# Patient Record
Sex: MALE | Race: BLACK OR AFRICAN AMERICAN | NOT HISPANIC OR LATINO | URBAN - METROPOLITAN AREA
[De-identification: names, ages, dates, MRNs, and addresses within clinical notes are randomized per-mention and may not be internally consistent; named-entity substitution may affect disease eponyms.]

---

## 2018-12-18 ENCOUNTER — EMERGENCY (EMERGENCY)
Facility: HOSPITAL | Age: 33
LOS: 1 days | Discharge: LEFT BEFORE DISCHARGE | End: 2018-12-18
Admitting: EMERGENCY MEDICINE

## 2018-12-18 VITALS
SYSTOLIC BLOOD PRESSURE: 129 MMHG | OXYGEN SATURATION: 100 % | TEMPERATURE: 98 F | DIASTOLIC BLOOD PRESSURE: 79 MMHG | WEIGHT: 139.99 LBS | HEART RATE: 104 BPM | RESPIRATION RATE: 16 BRPM

## 2018-12-18 NOTE — ED ADULT NURSE NOTE - CHIEF COMPLAINT QUOTE
c/o b/l eye redness, worse on red x 4 days ago. 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 denies vision change, pain, blurry vision, trauma. blood noted to sclera on right eye.

## 2018-12-18 NOTE — ED ADULT TRIAGE NOTE - CHIEF COMPLAINT QUOTE
c/o b/l eye redness, worse on red x 4 days ago. 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 denies vision change, pain, blurry vision. c/o b/l eye redness, worse on red x 4 days ago. 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 denies vision change, pain, blurry vision. blood noted to sclera on right eye. c/o b/l eye redness, worse on red x 4 days ago. 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 denies vision change, pain, blurry vision, trauma. blood noted to sclera on right eye.

## 2018-12-19 ENCOUNTER — EMERGENCY (EMERGENCY)
Facility: HOSPITAL | Age: 33
LOS: 1 days | Discharge: HOME | End: 2018-12-19
Attending: EMERGENCY MEDICINE | Admitting: EMERGENCY MEDICINE
Payer: SELF-PAY

## 2018-12-19 VITALS
OXYGEN SATURATION: 100 % | HEART RATE: 84 BPM | DIASTOLIC BLOOD PRESSURE: 84 MMHG | TEMPERATURE: 98 F | SYSTOLIC BLOOD PRESSURE: 133 MMHG | RESPIRATION RATE: 20 BRPM

## 2018-12-19 PROCEDURE — 99053 MED SERV 10PM-8AM 24 HR FAC: CPT

## 2018-12-19 PROCEDURE — 99283 EMERGENCY DEPT VISIT LOW MDM: CPT | Mod: 25

## 2018-12-19 RX ORDER — TETRAHYDROZOLINE/POLYETHYL GLY 0.05 %-1 %
0 DROPS OPHTHALMIC (EYE)
Qty: 0 | Refills: 0 | COMMUNITY

## 2018-12-19 NOTE — ED ADULT NURSE NOTE - OBJECTIVE STATEMENT
34 yo M c.o BL eye redness x 4 days. pt states "it started in my right eye and then went to my left eye". Pt states that eyes were itching so he started using visine which helped a little but he is waking up with crust in his eyes.  Pt denies visual disturbances at this time.

## 2018-12-19 NOTE — ED PROVIDER NOTE - OBJECTIVE STATEMENT
Patient with no pmhx, presents with itchy, red, and purulent R eye. denies trauma. denies visual changes. notes symptoms started one week ago, after he scratched his R eye with dirty hands, noticed the itching and yellowish crusting and drainage. does not wear contact lenses. denies facial swelling. notes symptoms now spreading to L eye. denies other symptoms.

## 2018-12-22 DIAGNOSIS — H53.8 OTHER VISUAL DISTURBANCES: ICD-10-CM

## 2018-12-23 DIAGNOSIS — H10.89 OTHER CONJUNCTIVITIS: ICD-10-CM

## 2018-12-23 DIAGNOSIS — H57.89 OTHER SPECIFIED DISORDERS OF EYE AND ADNEXA: ICD-10-CM

## 2018-12-23 DIAGNOSIS — Z79.899 OTHER LONG TERM (CURRENT) DRUG THERAPY: ICD-10-CM

## 2022-10-07 ENCOUNTER — EMERGENCY (EMERGENCY)
Facility: HOSPITAL | Age: 37
LOS: 1 days | Discharge: HOME | End: 2022-10-07
Admitting: EMERGENCY MEDICINE

## 2022-10-07 VITALS
WEIGHT: 139.99 LBS | TEMPERATURE: 98 F | OXYGEN SATURATION: 97 % | HEART RATE: 120 BPM | DIASTOLIC BLOOD PRESSURE: 79 MMHG | SYSTOLIC BLOOD PRESSURE: 144 MMHG | HEIGHT: 66 IN | RESPIRATION RATE: 18 BRPM

## 2022-10-07 VITALS — HEART RATE: 112 BPM | RESPIRATION RATE: 18 BRPM | OXYGEN SATURATION: 99 %

## 2022-10-07 LAB
ALBUMIN SERPL ELPH-MCNC: 4.2 G/DL — SIGNIFICANT CHANGE UP (ref 3.4–5)
ALP SERPL-CCNC: 55 U/L — SIGNIFICANT CHANGE UP (ref 40–120)
ALT FLD-CCNC: 34 U/L — SIGNIFICANT CHANGE UP (ref 12–42)
ANION GAP SERPL CALC-SCNC: 6 MMOL/L — LOW (ref 9–16)
ANISOCYTOSIS BLD QL: SLIGHT — SIGNIFICANT CHANGE UP
APPEARANCE UR: CLEAR — SIGNIFICANT CHANGE UP
AST SERPL-CCNC: 30 U/L — SIGNIFICANT CHANGE UP (ref 15–37)
BASOPHILS # BLD AUTO: 0.04 K/UL — SIGNIFICANT CHANGE UP (ref 0–0.2)
BASOPHILS NFR BLD AUTO: 0.6 % — SIGNIFICANT CHANGE UP (ref 0–2)
BILIRUB SERPL-MCNC: 0.3 MG/DL — SIGNIFICANT CHANGE UP (ref 0.2–1.2)
BILIRUB UR-MCNC: NEGATIVE — SIGNIFICANT CHANGE UP
BUN SERPL-MCNC: 16 MG/DL — SIGNIFICANT CHANGE UP (ref 7–23)
CALCIUM SERPL-MCNC: 9.1 MG/DL — SIGNIFICANT CHANGE UP (ref 8.5–10.5)
CHLORIDE SERPL-SCNC: 102 MMOL/L — SIGNIFICANT CHANGE UP (ref 96–108)
CO2 SERPL-SCNC: 29 MMOL/L — SIGNIFICANT CHANGE UP (ref 22–31)
COLOR SPEC: YELLOW — SIGNIFICANT CHANGE UP
CREAT SERPL-MCNC: 1.33 MG/DL — HIGH (ref 0.5–1.3)
D DIMER BLD IA.RAPID-MCNC: <187 NG/ML DDU — SIGNIFICANT CHANGE UP
DIFF PNL FLD: NEGATIVE — SIGNIFICANT CHANGE UP
EGFR: 70 ML/MIN/1.73M2 — SIGNIFICANT CHANGE UP
EOSINOPHIL # BLD AUTO: 0.08 K/UL — SIGNIFICANT CHANGE UP (ref 0–0.5)
EOSINOPHIL NFR BLD AUTO: 1.1 % — SIGNIFICANT CHANGE UP (ref 0–6)
GLUCOSE SERPL-MCNC: 178 MG/DL — HIGH (ref 70–99)
GLUCOSE UR QL: NEGATIVE — SIGNIFICANT CHANGE UP
HCT VFR BLD CALC: 42.2 % — SIGNIFICANT CHANGE UP (ref 39–50)
HGB BLD-MCNC: 12.5 G/DL — LOW (ref 13–17)
IMM GRANULOCYTES NFR BLD AUTO: 0.3 % — SIGNIFICANT CHANGE UP (ref 0–0.9)
KETONES UR-MCNC: NEGATIVE — SIGNIFICANT CHANGE UP
LEUKOCYTE ESTERASE UR-ACNC: NEGATIVE — SIGNIFICANT CHANGE UP
LYMPHOCYTES # BLD AUTO: 1.28 K/UL — SIGNIFICANT CHANGE UP (ref 1–3.3)
LYMPHOCYTES # BLD AUTO: 18.3 % — SIGNIFICANT CHANGE UP (ref 13–44)
MACROCYTES BLD QL: SLIGHT — SIGNIFICANT CHANGE UP
MANUAL SMEAR VERIFICATION: SIGNIFICANT CHANGE UP
MCHC RBC-ENTMCNC: 20.8 PG — LOW (ref 27–34)
MCHC RBC-ENTMCNC: 29.6 GM/DL — LOW (ref 32–36)
MCV RBC AUTO: 70.3 FL — LOW (ref 80–100)
MONOCYTES # BLD AUTO: 0.48 K/UL — SIGNIFICANT CHANGE UP (ref 0–0.9)
MONOCYTES NFR BLD AUTO: 6.9 % — SIGNIFICANT CHANGE UP (ref 2–14)
NEUTROPHILS # BLD AUTO: 5.08 K/UL — SIGNIFICANT CHANGE UP (ref 1.8–7.4)
NEUTROPHILS NFR BLD AUTO: 72.8 % — SIGNIFICANT CHANGE UP (ref 43–77)
NITRITE UR-MCNC: NEGATIVE — SIGNIFICANT CHANGE UP
NRBC # BLD: 0 /100 WBCS — SIGNIFICANT CHANGE UP (ref 0–0)
PCP SPEC-MCNC: SIGNIFICANT CHANGE UP
PH UR: 6 — SIGNIFICANT CHANGE UP (ref 5–8)
PLAT MORPH BLD: ABNORMAL
PLATELET # BLD AUTO: 241 K/UL — SIGNIFICANT CHANGE UP (ref 150–400)
PLATELET COUNT - ESTIMATE: NORMAL — SIGNIFICANT CHANGE UP
POTASSIUM SERPL-MCNC: 3.8 MMOL/L — SIGNIFICANT CHANGE UP (ref 3.5–5.3)
POTASSIUM SERPL-SCNC: 3.8 MMOL/L — SIGNIFICANT CHANGE UP (ref 3.5–5.3)
PROT SERPL-MCNC: 7.7 G/DL — SIGNIFICANT CHANGE UP (ref 6.4–8.2)
PROT UR-MCNC: NEGATIVE MG/DL — SIGNIFICANT CHANGE UP
RBC # BLD: 6 M/UL — HIGH (ref 4.2–5.8)
RBC # FLD: 16.1 % — HIGH (ref 10.3–14.5)
RBC BLD AUTO: ABNORMAL
SARS-COV-2 RNA SPEC QL NAA+PROBE: SIGNIFICANT CHANGE UP
SODIUM SERPL-SCNC: 137 MMOL/L — SIGNIFICANT CHANGE UP (ref 132–145)
SP GR SPEC: 1.02 — SIGNIFICANT CHANGE UP (ref 1–1.03)
TROPONIN I, HIGH SENSITIVITY RESULT: 6.4 NG/L — SIGNIFICANT CHANGE UP
TSH SERPL-MCNC: 3.16 UIU/ML — SIGNIFICANT CHANGE UP (ref 0.36–3.74)
UROBILINOGEN FLD QL: 0.2 E.U./DL — SIGNIFICANT CHANGE UP
WBC # BLD: 6.98 K/UL — SIGNIFICANT CHANGE UP (ref 3.8–10.5)
WBC # FLD AUTO: 6.98 K/UL — SIGNIFICANT CHANGE UP (ref 3.8–10.5)

## 2022-10-07 PROCEDURE — 99285 EMERGENCY DEPT VISIT HI MDM: CPT

## 2022-10-07 RX ORDER — SODIUM CHLORIDE 9 MG/ML
1000 INJECTION INTRAMUSCULAR; INTRAVENOUS; SUBCUTANEOUS ONCE
Refills: 0 | Status: COMPLETED | OUTPATIENT
Start: 2022-10-07 | End: 2022-10-07

## 2022-10-07 RX ADMIN — SODIUM CHLORIDE 1000 MILLILITER(S): 9 INJECTION INTRAMUSCULAR; INTRAVENOUS; SUBCUTANEOUS at 22:19

## 2022-10-07 NOTE — ED PROVIDER NOTE - PROGRESS NOTE DETAILS
received s/o from MOHAMUD Lopez, pending reassessment. Pt reports feeling better after IV hydration, orthostatic neg, added d-dimer and urinalysis/utox with unremarkable findings. Pt noted to be feeling anxious and reports feeling nervous in the ED, neruologically intact, otherwise, labs and reassessment with improvement. desires to go home and ddx/results/fu care discussed, pt verbalized understanding received s/o from MOHAMUD Lopez, pending reassessment. Pt reports feeling better after IV hydration, orthostatic neg, added d-dimer and urinalysis/utox with unremarkable findings. Pt noted to be feeling anxious and reports feeling nervous in the ED with persistently elevated HR in 120-130s, no CP or SOB or syncopal episodes, afebrile, neruologically intact, otherwise, labs, EKG, and reassessment with improvement. desires to go home and ddx/results/fu care discussed, f/u with PMD/card, pt verbalized understanding

## 2022-10-07 NOTE — ED PROVIDER NOTE - PROVIDER TOKENS
PROVIDER:[TOKEN:[[id number]]] PROVIDER:[TOKEN:[[id number]]],PROVIDER:[TOKEN:[[id number]]]

## 2022-10-07 NOTE — ED PROVIDER NOTE - OBJECTIVE STATEMENT
38 y/o M with no reported PMHx presents c/o dizziness yesterday. Pt reports yesterday he was leaning over, grabbing something from a cabinet when he stood up quickly and became lightheaded, lasted seconds and resolved. Pt denies hx of prior similar episodes and has had no other episodes of dizziness or lightheadedness since then; denies associated cp, sob, palpitations. Pt does report fatigue the last week but reports he works on his feet for 12 hours each day. Pt started taking iron supplements x 10 days as he wife does the same. Denies fever/chills, weight loss, HA, neck or back pain, syncope, cough, cp, sob, abd pain, n/v/d/c, urinary ssx, leg swelling, weakness, numbness, tingling, difficulty speaking or walking. Denies recent travel. Has not seen PCP in many years.

## 2022-10-07 NOTE — ED PROVIDER NOTE - PATIENT PORTAL LINK FT
You can access the FollowMyHealth Patient Portal offered by [facility] by registering at the following website: [email or web address]. By joining [employer]’s FollowMyHealth portal, you will also be able to view your health information using other applications (apps) compatible with our system. You can access the FollowMyHealth Patient Portal offered by [facility] by registering at the following website: [email or web address]. By joining "[employer]’s FollowMyHealth portal, you will also be able to view your health information using other applications (apps) compatible with our system.

## 2022-10-07 NOTE — ED ADULT NURSE NOTE - PRO INTERPRETER NEED 2
Normal rate, regular rhythm.  Heart sounds S1, S2.  No murmurs, rubs or gallops, pulses equal, no edema
English

## 2022-10-07 NOTE — ED ADULT TRIAGE NOTE - CHIEF COMPLAINT QUOTE
Pt states one episode of dizziness when he was "lifting his head up" yesterday, lasted about "5 seconds". Pt denies syncope or any dizziness today. hr elevated in triage. pt states "I'm nervous". Denies etoh or drug use.

## 2022-10-07 NOTE — ED PROVIDER NOTE - CLINICAL SUMMARY MEDICAL DECISION MAKING FREE TEXT BOX
36 y/o M with no reported PMHx presents c/o dizziness yesterday. Pt reports leaning over with head down, stood up quickly, became lightheaded x seconds, resolved, no other episodes. Also c/o fatigue x 2 weeks, started taking iron cause his wife did. No fever/chills, cp, sob, abd pain, n/v/d/c, syncope. Exam as above- well-appearing in NAD, VS with -140 (pt does admit to being very anxious), lungs cta b/l, abd soft nontender, neuro exam w/o focal deficits, amb with steady gait. Concern for episode of lightheadedness- sounds to be vasovagal but given associated fatigue will also assess for anemia, electrolyte abnl, thyroid abnl, mono, covid/flu. Concern for tachycardia- may be related to dehydration, denies drug use, will also obtain trop, ekg, unlikely PE as pt w/o ssx of cp, sob, syncope and all other PE RF negative. Plan for labs, EKG, IVF and reassess.

## 2022-10-07 NOTE — ED PROVIDER NOTE - CARE PLAN
1 Principal Discharge DX:	Dizziness  Secondary Diagnosis:	Anxiety   Principal Discharge DX:	Dizziness  Secondary Diagnosis:	Anxiety  Secondary Diagnosis:	Palpitations

## 2022-10-07 NOTE — ED PROVIDER NOTE - CARE PROVIDERS DIRECT ADDRESSES
[email or web address]

## 2022-10-07 NOTE — ED ADULT NURSE NOTE - NSIMPLEMENTINTERV_GEN_ALL_ED
Implemented All Universal Safety Interventions:  Blue to call system. Call bell, personal items and telephone within reach. Instruct patient to call for assistance. Room bathroom lighting operational. Non-slip footwear when patient is off stretcher. Physically safe environment: no spills, clutter or unnecessary equipment. Stretcher in lowest position, wheels locked, appropriate side rails in place.

## 2022-10-07 NOTE — ED PROVIDER NOTE - CARE PROVIDER_API CALL
Elizabeth Vazquez; MPH)  Internal Medicine  [address] [place] [address]  Phone: [phone]  Fax: [phone]  Follow Up Time:    Elizabeth Vazquez; MPH)  Internal Medicine  [address] [address]  Phone: [phone]  Fax: [phone]  Follow Up Time:     Elian Monzon)  Cardiovascular Disease  [address] [place] [address], [address] [place] [id number]  Phone: [phone]  Fax: [phone]  Follow Up Time:

## 2022-10-08 LAB
FLUAV H1 2009 PAND RNA SPEC QL NAA+PROBE: SIGNIFICANT CHANGE UP
FLUAV H1 RNA SPEC QL NAA+PROBE: SIGNIFICANT CHANGE UP
FLUAV H3 RNA SPEC QL NAA+PROBE: SIGNIFICANT CHANGE UP
FLUAV SUBTYP SPEC NAA+PROBE: SIGNIFICANT CHANGE UP
FLUBV RNA SPEC QL NAA+PROBE: SIGNIFICANT CHANGE UP
HETEROPH AB TITR SER AGGL: NEGATIVE — SIGNIFICANT CHANGE UP
RAPID RVP RESULT: SIGNIFICANT CHANGE UP
SARS-COV-2 RNA SPEC QL NAA+PROBE: SIGNIFICANT CHANGE UP

## 2022-10-11 DIAGNOSIS — R00.0 TACHYCARDIA, UNSPECIFIED: ICD-10-CM

## 2022-10-11 DIAGNOSIS — Z20.822 CONTACT WITH AND (SUSPECTED) EXPOSURE TO COVID-19: ICD-10-CM

## 2022-10-11 DIAGNOSIS — R42 DIZZINESS AND GIDDINESS: ICD-10-CM

## 2022-10-11 DIAGNOSIS — F41.9 ANXIETY DISORDER, UNSPECIFIED: ICD-10-CM

## 2022-10-11 DIAGNOSIS — R00.2 PALPITATIONS: ICD-10-CM

## 2023-04-21 NOTE — ED ADULT NURSE NOTE - PRO INTERPRETER NEED 2
English Dupixent Counseling: I discussed with the patient the risks of dupilumab including but not limited to eye infection and irritation, cold sores, injection site reactions, worsening of asthma, allergic reactions and increased risk of parasitic infection.  Live vaccines should be avoided while taking dupilumab. Dupilumab will also interact with certain medications such as warfarin and cyclosporine. The patient understands that monitoring is required and they must alert us or the primary physician if symptoms of infection or other concerning signs are noted.